# Patient Record
Sex: FEMALE | Race: WHITE | NOT HISPANIC OR LATINO | Employment: FULL TIME | URBAN - METROPOLITAN AREA
[De-identification: names, ages, dates, MRNs, and addresses within clinical notes are randomized per-mention and may not be internally consistent; named-entity substitution may affect disease eponyms.]

---

## 2024-07-15 ENCOUNTER — LAB (OUTPATIENT)
Facility: HOSPITAL | Age: 59
End: 2024-07-15
Payer: COMMERCIAL

## 2024-07-15 ENCOUNTER — OFFICE VISIT (OUTPATIENT)
Age: 59
End: 2024-07-15
Payer: COMMERCIAL

## 2024-07-15 VITALS
HEIGHT: 63 IN | BODY MASS INDEX: 30.97 KG/M2 | WEIGHT: 174.8 LBS | TEMPERATURE: 98 F | SYSTOLIC BLOOD PRESSURE: 110 MMHG | HEART RATE: 99 BPM | DIASTOLIC BLOOD PRESSURE: 74 MMHG

## 2024-07-15 DIAGNOSIS — M32.9 SYSTEMIC LUPUS ERYTHEMATOSUS, UNSPECIFIED SLE TYPE, UNSPECIFIED ORGAN INVOLVEMENT STATUS: ICD-10-CM

## 2024-07-15 DIAGNOSIS — M32.9 SYSTEMIC LUPUS ERYTHEMATOSUS, UNSPECIFIED SLE TYPE, UNSPECIFIED ORGAN INVOLVEMENT STATUS: Chronic | ICD-10-CM

## 2024-07-15 DIAGNOSIS — Z79.52 CURRENT USE OF STEROID MEDICATION: ICD-10-CM

## 2024-07-15 DIAGNOSIS — Z79.899 HIGH RISK MEDICATION USE: ICD-10-CM

## 2024-07-15 DIAGNOSIS — M05.9 SEROPOSITIVE RHEUMATOID ARTHRITIS: Primary | Chronic | ICD-10-CM

## 2024-07-15 DIAGNOSIS — Z79.52 CURRENT USE OF STEROID MEDICATION: Chronic | ICD-10-CM

## 2024-07-15 DIAGNOSIS — Z79.899 HIGH RISK MEDICATION USE: Chronic | ICD-10-CM

## 2024-07-15 DIAGNOSIS — M05.9 SEROPOSITIVE RHEUMATOID ARTHRITIS: ICD-10-CM

## 2024-07-15 LAB
ALBUMIN SERPL-MCNC: 4.3 G/DL (ref 3.5–5.2)
ALBUMIN/GLOB SERPL: 1.3 G/DL
ALP SERPL-CCNC: 94 U/L (ref 39–117)
ALT SERPL W P-5'-P-CCNC: 23 U/L (ref 1–33)
ANION GAP SERPL CALCULATED.3IONS-SCNC: 13 MMOL/L (ref 5–15)
AST SERPL-CCNC: 24 U/L (ref 1–32)
BILIRUB SERPL-MCNC: 0.4 MG/DL (ref 0–1.2)
BUN SERPL-MCNC: 17 MG/DL (ref 6–20)
BUN/CREAT SERPL: 22.4 (ref 7–25)
CALCIUM SPEC-SCNC: 9.8 MG/DL (ref 8.6–10.5)
CHLORIDE SERPL-SCNC: 106 MMOL/L (ref 98–107)
CK SERPL-CCNC: 113 U/L (ref 20–180)
CO2 SERPL-SCNC: 24 MMOL/L (ref 22–29)
CREAT SERPL-MCNC: 0.76 MG/DL (ref 0.57–1)
CRP SERPL-MCNC: <0.3 MG/DL (ref 0–0.5)
EGFRCR SERPLBLD CKD-EPI 2021: 92.1 ML/MIN/1.73
GLOBULIN UR ELPH-MCNC: 3.3 GM/DL
GLUCOSE SERPL-MCNC: 86 MG/DL (ref 65–99)
POTASSIUM SERPL-SCNC: 3.9 MMOL/L (ref 3.5–5.2)
PROT SERPL-MCNC: 7.6 G/DL (ref 6–8.5)
SODIUM SERPL-SCNC: 143 MMOL/L (ref 136–145)

## 2024-07-15 PROCEDURE — 36415 COLL VENOUS BLD VENIPUNCTURE: CPT

## 2024-07-15 PROCEDURE — 85025 COMPLETE CBC W/AUTO DIFF WBC: CPT

## 2024-07-15 PROCEDURE — 86140 C-REACTIVE PROTEIN: CPT

## 2024-07-15 PROCEDURE — 86160 COMPLEMENT ANTIGEN: CPT

## 2024-07-15 PROCEDURE — 80053 COMPREHEN METABOLIC PANEL: CPT

## 2024-07-15 PROCEDURE — 82550 ASSAY OF CK (CPK): CPT

## 2024-07-15 PROCEDURE — 86225 DNA ANTIBODY NATIVE: CPT

## 2024-07-15 PROCEDURE — 85652 RBC SED RATE AUTOMATED: CPT

## 2024-07-15 PROCEDURE — 81001 URINALYSIS AUTO W/SCOPE: CPT

## 2024-07-15 PROCEDURE — 99214 OFFICE O/P EST MOD 30 MIN: CPT | Performed by: INTERNAL MEDICINE

## 2024-07-15 RX ORDER — PREDNISONE 2.5 MG/1
2.5 TABLET ORAL DAILY
Qty: 30 TABLET | Refills: 5 | Status: SHIPPED | OUTPATIENT
Start: 2024-07-15

## 2024-07-15 RX ORDER — ALBUTEROL SULFATE 90 UG/1
AEROSOL, METERED RESPIRATORY (INHALATION)
COMMUNITY

## 2024-07-15 RX ORDER — DEXTROAMPHETAMINE SACCHARATE, AMPHETAMINE ASPARTATE MONOHYDRATE, DEXTROAMPHETAMINE SULFATE AND AMPHETAMINE SULFATE 1.25; 1.25; 1.25; 1.25 MG/1; MG/1; MG/1; MG/1
1 CAPSULE, EXTENDED RELEASE ORAL 2 TIMES DAILY
COMMUNITY

## 2024-07-15 RX ORDER — HYDROXYCHLOROQUINE SULFATE 200 MG/1
400 TABLET, FILM COATED ORAL DAILY
Qty: 60 TABLET | Refills: 5 | Status: SHIPPED | OUTPATIENT
Start: 2024-07-15

## 2024-07-15 RX ORDER — ONDANSETRON HYDROCHLORIDE 8 MG/1
TABLET, FILM COATED ORAL EVERY 8 HOURS PRN
COMMUNITY

## 2024-07-15 RX ORDER — LEVOCETIRIZINE DIHYDROCHLORIDE 5 MG/1
1 TABLET, FILM COATED ORAL DAILY
COMMUNITY

## 2024-07-15 RX ORDER — POTASSIUM CHLORIDE 750 MG/1
2 TABLET, EXTENDED RELEASE ORAL DAILY
COMMUNITY

## 2024-07-15 RX ORDER — MONTELUKAST SODIUM 10 MG/1
1 TABLET ORAL
COMMUNITY

## 2024-07-15 RX ORDER — HYDROCHLOROTHIAZIDE 25 MG/1
1 TABLET ORAL EVERY MORNING
COMMUNITY

## 2024-07-15 RX ORDER — EPINEPHRINE 0.3 MG/.3ML
INJECTION SUBCUTANEOUS
COMMUNITY

## 2024-07-15 RX ORDER — RIBOFLAVIN (VITAMIN B2) 100 MG
1 TABLET ORAL DAILY
COMMUNITY

## 2024-07-15 RX ORDER — FLUTICASONE PROPIONATE 50 MCG
2 SPRAY, SUSPENSION (ML) NASAL DAILY
COMMUNITY

## 2024-07-15 RX ORDER — DICLOFENAC SODIUM 75 MG/1
1 TABLET, DELAYED RELEASE ORAL 2 TIMES DAILY
COMMUNITY

## 2024-07-15 RX ORDER — CONJUGATED ESTROGENS 0.62 MG/G
CREAM VAGINAL
COMMUNITY

## 2024-07-15 NOTE — ASSESSMENT & PLAN NOTE
* Plaquenil 200 mg PO BID for SLE/RA overlap   * Started 5/6/21  Patient's taking hydroxychloroquine should have an eye exam at least once/year to monitor for signs of medication toxicity

## 2024-07-15 NOTE — ASSESSMENT & PLAN NOTE
* 3/11/21: HLA B27 negative, Lyme negative, RF 27. 1 (<13.9), TAURUS negative, ESR normal, CRP normal, uric acid 5.4, Hg 15.1, CBC ok otherwise   * 4/21/21: TAURUS + 1:640 homogenous.  Rf IgG 21 (-20), RF IgM 78 (0-25),  (0-83), Fernandes 151 (0-89).  Creatinine 1.06, GFR 60.    * 4/21/21: Bilateral hands and feet x-ray show mild OA changes.  * Sibling with SLE  * Sibling with RA  * Medications/treatments/interventions tried include: Tylenol, diclofenac, she saw an orthopaedic surgeon, Topiramate, Medrol Dosepak, carpal tunnel injection, Plaquenil, prednisone, she has done some physical therapy, she has met with a dietician    1. We gave her a patient education handout to take home and review regarding rheumatoid arthritis  2. Follow up in 4-6 months  3. Continue Plaquenil  4. Check labs  5. She is currently taking 2.5 mg of prednisone daily.  Attempts to taker have failed   6. Weight loss would be beneficial   7. Refill medications  8. She has taken NSAIDS like diclofenac PRN   9. Tylenol PRN  as directed is fine too

## 2024-07-15 NOTE — ASSESSMENT & PLAN NOTE
Prednisone 2.5 mg/day for RA/SLE  Ideally she will taper off as her condition improves/stabilizes. Prior attempts at tapering have resulted in increased pain/swelling/stiffness. Refill today.

## 2024-07-15 NOTE — PROGRESS NOTES
Office Follow Up      Date: 07/15/2024   Patient Name: Francine DURBIN  MRN: [id number]  YOB: 1965    Referring Physician: Vivek Gonzalez MD     Chief Complaint   Patient presents with    Rheumatoid Arthritis     Follow up    Lupus     Follow up       History of Present Illness: Francine DURBIN is a 58 y.o. female who is here today for follow up. She established care here with us as of 4/22/21.   We have prescribed her Plaquenil 200 mg BID and prednisone 2.5 mg/day. No recent injuries or infections. No fever.     Today she rates her pain as 5/10 in severity. She reports 30 minutes/day of morning stiffness. No red or hot joints.  No swelling. No neck problems. No muscle pain or weakness. She has some back discomfort.     No rash.  No hair loss. She bruises easily. No lymphadenopathy. No headaches. No paresthesias. No  issues. She is constipated. No chest pain or shortness of breath. No sicca symptoms.  She is fatigued.       Subjective     Review of Systems   Constitutional:  Positive for fatigue.   HENT:  Positive for sinus pressure.    Eyes: Negative.    Respiratory: Negative.     Cardiovascular: Negative.    Gastrointestinal:  Positive for constipation.   Endocrine: Negative.    Genitourinary: Negative.    Musculoskeletal:  Positive for arthralgias and back pain.   Skin: Negative.  Positive for bruise.   Allergic/Immunologic: Negative.    Neurological: Negative.    Hematological:  Bruises/bleeds easily.   Psychiatric/Behavioral: Negative.     All other systems reviewed and are negative.         Current Outpatient Medications:     albuterol sulfate  (90 Base) MCG/ACT inhaler, inhale 1 puff by mouth every 6 hours as needed for wheezing or shortness of breath, Disp: , Rfl:     amphetamine-dextroamphetamine XR (ADDERALL XR) 5 MG 24 hr capsule, Take 1 capsule by mouth 2 (Two) Times a Day, Disp: , Rfl:     desvenlafaxine (PRISTIQ) 50 MG 24 hr tablet, Take 1 tablet by mouth  Daily., Disp: , Rfl:     dextroamphetamine (DEXEDRINE SPANSULE) 10 MG 24 hr capsule, Take 1 capsule by mouth Daily., Disp: , Rfl:     diclofenac (VOLTAREN) 75 MG EC tablet, Take 1 tablet by mouth 2 (Two) Times a Day., Disp: , Rfl:     EPINEPHrine (EPIPEN) 0.3 MG/0.3ML solution auto-injector injection, ADMINISTER 0.3 ML IN THE MUSCLE 1 TIME AS NEEDED FOR ANAPHYLAXIS, Disp: , Rfl:     Estrogens Conjugated (Premarin) 0.625 MG/GM vaginal cream, apply 1 application VAGINALLY ONCE DAILY AS DIRECTED, Disp: , Rfl:     fluticasone (FLONASE) 50 MCG/ACT nasal spray, 2 sprays by Each Nare route Daily., Disp: , Rfl:     hydroCHLOROthiazide 25 MG tablet, Take 1 tablet by mouth Every Morning., Disp: , Rfl:     hydroxychloroquine (PLAQUENIL) 200 MG tablet, Take 2 tablets by mouth Daily., Disp: 60 tablet, Rfl: 5    levocetirizine (XYZAL) 5 MG tablet, Take 1 tablet by mouth Daily., Disp: , Rfl:     montelukast (SINGULAIR) 10 MG tablet, Take 1 tablet by mouth every night at bedtime., Disp: , Rfl:     ondansetron (ZOFRAN) 8 MG tablet, Every 8 (Eight) Hours As Needed., Disp: , Rfl:     potassium chloride (KLOR-CON M10) 10 MEQ CR tablet, Take 2 tablets by mouth Daily., Disp: , Rfl:     predniSONE (DELTASONE) 2.5 MG tablet, Take 1 tablet by mouth Daily., Disp: 30 tablet, Rfl: 5    Vitamin B-2 (RIBOFLAVIN) 100 MG tablet tablet, Take 1 tablet by mouth Daily., Disp: , Rfl:     Vitamin D, Cholecalciferol, (CHOLECALCIFEROL) 400 UNITS tablet, Take 1 tablet by mouth Daily., Disp: , Rfl:     vitamin E 100 UNIT capsule, Take 1 capsule by mouth Daily., Disp: , Rfl:     Allergies   Allergen Reactions    Penicillins Itching       I have reviewed and updated the patient's chief complaint, history of present illness, review of systems, past medical history, surgical history, family history, social history, medications and allergy list as appropriate.     Objective      Vitals:    07/15/24 1340   BP: 110/74   BP Location: Left arm   Patient Position:  "Sitting   Cuff Size: Adult   Pulse: 99   Temp: 98 °F (36.7 °C)   Weight: 79.3 kg (174 lb 12.8 oz)   Height: 160 cm (62.99\")   PainSc:   5   PainLoc: Back  Comment: lower     Body mass index is 30.97 kg/m².      Physical Exam     General: Well appearing 58 year old  female. Not in distress. She is ambulating unassisted.   SKIN: No rash.  No alopecia. No subcutaneous nodules. No digital pits or ulcers. No sclerodactyly.   HEENT: NCAT. Conjunctiva clear, no photophobia. No oral or nasal ulcers. Hearing intact.    Pulmonary: Clear to auscultation bilaterally. No wheezing, rales, or rhonchi.  CV: Regular rate and rhythm. No murmurs, rubs, or gallops.   Psych: Normal mood and affect. Alert and oriented x 3.   Extremities: No cyanosis or edema.   Musculoskeletal:  No tenderness to palpation.  No warmth or erythema. Normal range of motion of the wrists, ankles, elbows, and knees. No acute swelling.   Lymph: No palpable cervical adenopathy    Procedures    Assessment / Plan      Assessment & Plan  Seropositive rheumatoid arthritis  * 3/11/21: HLA B27 negative, Lyme negative, RF 27. 1 (<13.9), TAURUS negative, ESR normal, CRP normal, uric acid 5.4, Hg 15.1, CBC ok otherwise   * 4/21/21: TAURUS + 1:640 homogenous.  Rf IgG 21 (-20), RF IgM 78 (0-25),  (0-83), Fernandes 151 (0-89).  Creatinine 1.06, GFR 60.    * 4/21/21: Bilateral hands and feet x-ray show mild OA changes.  * Sibling with SLE  * Sibling with RA  * Medications/treatments/interventions tried include: Tylenol, diclofenac, she saw an orthopaedic surgeon, Topiramate, Medrol Dosepak, carpal tunnel injection, Plaquenil, prednisone, she has done some physical therapy, she has met with a dietician    1. We gave her a patient education handout to take home and review regarding rheumatoid arthritis  2. Follow up in 4-6 months  3. Continue Plaquenil  4. Check labs  5. She is currently taking 2.5 mg of prednisone daily.  Attempts to taker have failed   6. Weight loss " would be beneficial   7. Refill medications  8. She has taken NSAIDS like diclofenac PRN   9. Tylenol PRN  as directed is fine too   Systemic lupus erythematosus, unspecified SLE type, unspecified organ involvement status  1. Continue Plaquenil  2. Check labs  3. Continue 2.5 mg prednisone daily  High risk medication use  * Plaquenil 200 mg PO BID for SLE/RA overlap   * Started 5/6/21  Patient's taking hydroxychloroquine should have an eye exam at least once/year to monitor for signs of medication toxicity    Current use of steroid medication  Prednisone 2.5 mg/day for RA/SLE  Ideally she will taper off as her condition improves/stabilizes. Prior attempts at tapering have resulted in increased pain/swelling/stiffness. Refill today.     Orders Placed This Encounter   Procedures    Anti-DNA Antibody, Double-stranded    C4+C3    CBC Auto Differential    CK    Comprehensive Metabolic Panel    Urinalysis With Culture If Indicated -    Sedimentation Rate    C-reactive Protein     New Medications Ordered This Visit   Medications    hydroxychloroquine (PLAQUENIL) 200 MG tablet     Sig: Take 2 tablets by mouth Daily.     Dispense:  60 tablet     Refill:  5    predniSONE (DELTASONE) 2.5 MG tablet     Sig: Take 1 tablet by mouth Daily.     Dispense:  30 tablet     Refill:  5         Follow Up:   Return in about 6 months (around 1/15/2025).      Greg Lovell DO  [facility] [place]

## 2024-07-16 LAB
BACTERIA UR QL AUTO: ABNORMAL /HPF
BASOPHILS # BLD AUTO: 0.03 10*3/MM3 (ref 0–0.2)
BASOPHILS NFR BLD AUTO: 0.7 % (ref 0–1.5)
BILIRUB UR QL STRIP: NEGATIVE
C3 SERPL-MCNC: 119 MG/DL (ref 82–167)
C4 SERPL-MCNC: 22 MG/DL (ref 14–44)
CLARITY UR: CLEAR
COLOR UR: ABNORMAL
DEPRECATED RDW RBC AUTO: 42.5 FL (ref 37–54)
EOSINOPHIL # BLD AUTO: 0.04 10*3/MM3 (ref 0–0.4)
EOSINOPHIL NFR BLD AUTO: 0.9 % (ref 0.3–6.2)
ERYTHROCYTE [DISTWIDTH] IN BLOOD BY AUTOMATED COUNT: 13 % (ref 12.3–15.4)
ERYTHROCYTE [SEDIMENTATION RATE] IN BLOOD: 16 MM/HR (ref 0–30)
GLUCOSE UR STRIP-MCNC: NEGATIVE MG/DL
HCT VFR BLD AUTO: 46.1 % (ref 34–46.6)
HGB BLD-MCNC: 15.7 G/DL (ref 12–15.9)
HGB UR QL STRIP.AUTO: NEGATIVE
HOLD SPECIMEN: NORMAL
HYALINE CASTS UR QL AUTO: ABNORMAL /LPF
IMM GRANULOCYTES # BLD AUTO: 0.01 10*3/MM3 (ref 0–0.05)
IMM GRANULOCYTES NFR BLD AUTO: 0.2 % (ref 0–0.5)
KETONES UR QL STRIP: ABNORMAL
LEUKOCYTE ESTERASE UR QL STRIP.AUTO: ABNORMAL
LYMPHOCYTES # BLD AUTO: 0.82 10*3/MM3 (ref 0.7–3.1)
LYMPHOCYTES NFR BLD AUTO: 17.8 % (ref 19.6–45.3)
MCH RBC QN AUTO: 30.6 PG (ref 26.6–33)
MCHC RBC AUTO-ENTMCNC: 34.1 G/DL (ref 31.5–35.7)
MCV RBC AUTO: 89.9 FL (ref 79–97)
MONOCYTES # BLD AUTO: 0.42 10*3/MM3 (ref 0.1–0.9)
MONOCYTES NFR BLD AUTO: 9.1 % (ref 5–12)
NEUTROPHILS NFR BLD AUTO: 3.28 10*3/MM3 (ref 1.7–7)
NEUTROPHILS NFR BLD AUTO: 71.3 % (ref 42.7–76)
NITRITE UR QL STRIP: NEGATIVE
NRBC BLD AUTO-RTO: 0 /100 WBC (ref 0–0.2)
PH UR STRIP.AUTO: 5.5 [PH] (ref 5–8)
PLATELET # BLD AUTO: 287 10*3/MM3 (ref 140–450)
PMV BLD AUTO: 10.1 FL (ref 6–12)
PROT UR QL STRIP: ABNORMAL
RBC # BLD AUTO: 5.13 10*6/MM3 (ref 3.77–5.28)
RBC # UR STRIP: ABNORMAL /HPF
REF LAB TEST METHOD: ABNORMAL
SP GR UR STRIP: 1.02 (ref 1–1.03)
SQUAMOUS #/AREA URNS HPF: ABNORMAL /HPF
UROBILINOGEN UR QL STRIP: ABNORMAL
WBC # UR STRIP: ABNORMAL /HPF
WBC NRBC COR # BLD AUTO: 4.6 10*3/MM3 (ref 3.4–10.8)

## 2024-07-17 LAB — DSDNA AB SER-ACNC: 2 IU/ML (ref 0–9)

## 2025-01-16 ENCOUNTER — OFFICE VISIT (OUTPATIENT)
Age: 60
End: 2025-01-16
Payer: COMMERCIAL

## 2025-01-16 ENCOUNTER — LAB (OUTPATIENT)
Facility: HOSPITAL | Age: 60
End: 2025-01-16
Payer: COMMERCIAL

## 2025-01-16 VITALS
HEIGHT: 63 IN | BODY MASS INDEX: 31.89 KG/M2 | DIASTOLIC BLOOD PRESSURE: 80 MMHG | HEART RATE: 100 BPM | WEIGHT: 180 LBS | TEMPERATURE: 97.3 F | SYSTOLIC BLOOD PRESSURE: 130 MMHG

## 2025-01-16 DIAGNOSIS — M32.9 SYSTEMIC LUPUS ERYTHEMATOSUS, UNSPECIFIED SLE TYPE, UNSPECIFIED ORGAN INVOLVEMENT STATUS: Chronic | ICD-10-CM

## 2025-01-16 DIAGNOSIS — Z79.899 HIGH RISK MEDICATION USE: Chronic | ICD-10-CM

## 2025-01-16 DIAGNOSIS — Z79.52 CURRENT USE OF STEROID MEDICATION: ICD-10-CM

## 2025-01-16 DIAGNOSIS — M15.0 PRIMARY OSTEOARTHRITIS INVOLVING MULTIPLE JOINTS: ICD-10-CM

## 2025-01-16 DIAGNOSIS — M32.9 SYSTEMIC LUPUS ERYTHEMATOSUS, UNSPECIFIED SLE TYPE, UNSPECIFIED ORGAN INVOLVEMENT STATUS: ICD-10-CM

## 2025-01-16 DIAGNOSIS — M05.9 SEROPOSITIVE RHEUMATOID ARTHRITIS: Primary | Chronic | ICD-10-CM

## 2025-01-16 DIAGNOSIS — M15.0 PRIMARY OSTEOARTHRITIS INVOLVING MULTIPLE JOINTS: Chronic | ICD-10-CM

## 2025-01-16 DIAGNOSIS — Z79.899 HIGH RISK MEDICATION USE: ICD-10-CM

## 2025-01-16 DIAGNOSIS — Z79.52 CURRENT USE OF STEROID MEDICATION: Chronic | ICD-10-CM

## 2025-01-16 DIAGNOSIS — M05.9 SEROPOSITIVE RHEUMATOID ARTHRITIS: ICD-10-CM

## 2025-01-16 LAB
ALBUMIN SERPL-MCNC: 4.1 G/DL (ref 3.5–5.2)
ALBUMIN/GLOB SERPL: 1.2 G/DL
ALP SERPL-CCNC: 96 U/L (ref 39–117)
ALT SERPL W P-5'-P-CCNC: 25 U/L (ref 1–33)
ANION GAP SERPL CALCULATED.3IONS-SCNC: 11.2 MMOL/L (ref 5–15)
AST SERPL-CCNC: 31 U/L (ref 1–32)
BILIRUB SERPL-MCNC: 0.4 MG/DL (ref 0–1.2)
BUN SERPL-MCNC: 15 MG/DL (ref 6–20)
BUN/CREAT SERPL: 14.2 (ref 7–25)
CALCIUM SPEC-SCNC: 9.5 MG/DL (ref 8.6–10.5)
CHLORIDE SERPL-SCNC: 102 MMOL/L (ref 98–107)
CK SERPL-CCNC: 243 U/L (ref 20–180)
CO2 SERPL-SCNC: 28.8 MMOL/L (ref 22–29)
CREAT SERPL-MCNC: 1.06 MG/DL (ref 0.57–1)
CRP SERPL-MCNC: <0.3 MG/DL (ref 0–0.5)
EGFRCR SERPLBLD CKD-EPI 2021: 61.4 ML/MIN/1.73
GLOBULIN UR ELPH-MCNC: 3.4 GM/DL
GLUCOSE SERPL-MCNC: 87 MG/DL (ref 65–99)
HOLD SPECIMEN: NORMAL
POTASSIUM SERPL-SCNC: 3.9 MMOL/L (ref 3.5–5.2)
PROT SERPL-MCNC: 7.5 G/DL (ref 6–8.5)
SODIUM SERPL-SCNC: 142 MMOL/L (ref 136–145)

## 2025-01-16 PROCEDURE — 80053 COMPREHEN METABOLIC PANEL: CPT

## 2025-01-16 PROCEDURE — 86140 C-REACTIVE PROTEIN: CPT

## 2025-01-16 PROCEDURE — 86160 COMPLEMENT ANTIGEN: CPT

## 2025-01-16 PROCEDURE — 82550 ASSAY OF CK (CPK): CPT

## 2025-01-16 PROCEDURE — 81003 URINALYSIS AUTO W/O SCOPE: CPT

## 2025-01-16 PROCEDURE — 36415 COLL VENOUS BLD VENIPUNCTURE: CPT

## 2025-01-16 PROCEDURE — 85652 RBC SED RATE AUTOMATED: CPT

## 2025-01-16 PROCEDURE — 86225 DNA ANTIBODY NATIVE: CPT

## 2025-01-16 PROCEDURE — 85025 COMPLETE CBC W/AUTO DIFF WBC: CPT

## 2025-01-16 RX ORDER — PREDNISONE 2.5 MG/1
2.5 TABLET ORAL DAILY
Qty: 30 TABLET | Refills: 5 | Status: SHIPPED | OUTPATIENT
Start: 2025-01-16

## 2025-01-16 RX ORDER — HYDROXYCHLOROQUINE SULFATE 200 MG/1
400 TABLET, FILM COATED ORAL DAILY
Qty: 60 TABLET | Refills: 5 | Status: SHIPPED | OUTPATIENT
Start: 2025-01-16

## 2025-01-16 NOTE — ASSESSMENT & PLAN NOTE
Tylenol PRN is ok as directed  She has tried NSAIDS like diclofenac PRN   She has seen orthopaedic surgeons   She has done some physical therapy   She has seen pain management specialists   She has had back injections

## 2025-01-16 NOTE — ASSESSMENT & PLAN NOTE
* 3/11/21: HLA B27 negative, Lyme negative, RF 27. 1 (<13.9), TAURUS negative, ESR normal, CRP normal, uric acid 5.4, Hg 15.1, CBC ok otherwise   * 4/21/21: TAURUS + 1:640 homogenous.  Rf IgG 21 (-20), RF IgM 78 (0-25),  (0-83), Fernandes 151 (0-89).  Creatinine 1.06, GFR 60.    * 4/21/21: Bilateral hands and feet x-ray show mild OA changes.  * Sibling with SLE  * Sibling with RA  * Medications/treatments/interventions tried include: Tylenol, diclofenac, she saw an orthopaedic surgeon, Topiramate, Medrol Dosepak, carpal tunnel injection, Plaquenil, prednisone, she has done some physical therapy, she has met with a dietician, She has had back injections, she has seen pain management specialists      1. We gave her a patient education handout to take home and review regarding rheumatoid arthritis  2. Follow up in 6 months  3. Continue Plaquenil  4. Check labs  5. She is currently taking 2.5 mg of prednisone daily.  Attempts to taker have failed   6. Weight loss would be beneficial   7. Refill medications  8. She has taken NSAIDS like diclofenac PRN   9. Tylenol PRN  as directed is fine too        [Eyesight Problems] : eyesight problems [Loss Of Hearing] : hearing loss [Negative] : Gastrointestinal

## 2025-01-16 NOTE — PROGRESS NOTES
Office Follow Up      Date: 01/16/2025   Patient Name: Francine DURBIN  MRN: [id number]  YOB: 1965    Referring Physician: No ref. provider found     Chief Complaint   Patient presents with    Lupus     Follow up    Osteoarthritis     Follow up    Rheumatoid Arthritis     Follow up       History of Present Illness: Francine DURBIN is a 59 y.o. female who is here today for follow up. She established care here with us as of 4/22/21.   We have prescribed her Plaquenil 200 mg BID and prednisone 2.5 mg/day. No recent injuries or infections. No fever.     Today she rates her pain as 5/10 in severity. She reports 30 minutes/day of morning stiffness. No red or hot joints. No swelling. No neck problems. No muscle pain or weakness. She has some back discomfort.     No rash. Her skin is dry particularly around her elbow. No hair loss. She bruises easily. No lymphadenopathy. No headaches. No paresthesias. No GI or  issues. No chest pain or shortness of breath. No sicca symptoms.      Subjective     Review of Systems   Constitutional: Negative.    HENT: Negative.     Eyes:  Positive for itching.   Respiratory: Negative.     Cardiovascular: Negative.    Gastrointestinal: Negative.    Endocrine: Negative.    Genitourinary: Negative.    Musculoskeletal:  Positive for arthralgias and back pain.   Skin:  Positive for dry skin and bruise.   Allergic/Immunologic: Negative.    Neurological: Negative.    Hematological:  Bruises/bleeds easily.   Psychiatric/Behavioral: Negative.     All other systems reviewed and are negative.         Current Outpatient Medications:     albuterol sulfate  (90 Base) MCG/ACT inhaler, inhale 1 puff by mouth every 6 hours as needed for wheezing or shortness of breath, Disp: , Rfl:     amphetamine-dextroamphetamine XR (ADDERALL XR) 5 MG 24 hr capsule, Take 1 capsule by mouth 2 (Two) Times a Day, Disp: , Rfl:     desvenlafaxine (PRISTIQ) 50 MG 24 hr tablet, Take 1  tablet by mouth Daily., Disp: , Rfl:     dextroamphetamine (DEXEDRINE SPANSULE) 10 MG 24 hr capsule, Take 1 capsule by mouth Daily., Disp: , Rfl:     diclofenac (VOLTAREN) 75 MG EC tablet, Take 1 tablet by mouth 2 (Two) Times a Day., Disp: , Rfl:     EPINEPHrine (EPIPEN) 0.3 MG/0.3ML solution auto-injector injection, ADMINISTER 0.3 ML IN THE MUSCLE 1 TIME AS NEEDED FOR ANAPHYLAXIS, Disp: , Rfl:     Estrogens Conjugated (Premarin) 0.625 MG/GM vaginal cream, apply 1 application VAGINALLY ONCE DAILY AS DIRECTED, Disp: , Rfl:     fluticasone (FLONASE) 50 MCG/ACT nasal spray, 2 sprays by Each Nare route Daily., Disp: , Rfl:     hydroCHLOROthiazide 25 MG tablet, Take 1 tablet by mouth Every Morning., Disp: , Rfl:     hydroxychloroquine (PLAQUENIL) 200 MG tablet, Take 2 tablets by mouth Daily., Disp: 60 tablet, Rfl: 5    levocetirizine (XYZAL) 5 MG tablet, Take 1 tablet by mouth Daily., Disp: , Rfl:     montelukast (SINGULAIR) 10 MG tablet, Take 1 tablet by mouth every night at bedtime., Disp: , Rfl:     ondansetron (ZOFRAN) 8 MG tablet, Every 8 (Eight) Hours As Needed., Disp: , Rfl:     potassium chloride (KLOR-CON M10) 10 MEQ CR tablet, Take 2 tablets by mouth Daily., Disp: , Rfl:     predniSONE (DELTASONE) 2.5 MG tablet, Take 1 tablet by mouth Daily., Disp: 30 tablet, Rfl: 5    Vitamin B-2 (RIBOFLAVIN) 100 MG tablet tablet, Take 1 tablet by mouth Daily., Disp: , Rfl:     Vitamin D, Cholecalciferol, (CHOLECALCIFEROL) 400 UNITS tablet, Take 1 tablet by mouth Daily., Disp: , Rfl:     vitamin E 100 UNIT capsule, Take 1 capsule by mouth Daily., Disp: , Rfl:     Allergies   Allergen Reactions    Penicillins Itching and Unknown (See Comments)       I have reviewed and updated the patient's chief complaint, history of present illness, review of systems, past medical history, surgical history, family history, social history, medications and allergy list as appropriate.     Objective      Vitals:    01/16/25 1140   BP: 130/80  "  BP Location: Left arm   Pulse: 100   Temp: 97.3 °F (36.3 °C)   Weight: 81.6 kg (180 lb)   Height: 160 cm (63\")   PainSc:   5   PainLoc: Elbow  Comment: lt elbow has been real dry and sore     Body mass index is 31.89 kg/m².      Physical Exam     General: Well appearing 59 year old  female. Not in distress. She is ambulating unassisted.   SKIN: No rash.  No alopecia. No subcutaneous nodules. No digital pits or ulcers. No sclerodactyly.   HEENT: NCAT. Conjunctiva clear, no photophobia. No oral or nasal ulcers. Hearing intact.    Pulmonary: Clear to auscultation bilaterally. No wheezing, rales, or rhonchi.  CV: Regular rate and rhythm. No murmurs, rubs, or gallops.   Psych: Normal mood and affect. Alert and oriented x 3.   Extremities: No cyanosis or edema.   Musculoskeletal:  No tenderness to palpation.  No warmth or erythema. Normal range of motion of the wrists, ankles, elbows, and knees. No acute swelling.   Lymph: No palpable cervical adenopathy    Procedures    Assessment / Plan      Assessment & Plan  Seropositive rheumatoid arthritis  * 3/11/21: HLA B27 negative, Lyme negative, RF 27. 1 (<13.9), TAURUS negative, ESR normal, CRP normal, uric acid 5.4, Hg 15.1, CBC ok otherwise   * 4/21/21: TAURUS + 1:640 homogenous.  Rf IgG 21 (-20), RF IgM 78 (0-25),  (0-83), Fernandes 151 (0-89).  Creatinine 1.06, GFR 60.    * 4/21/21: Bilateral hands and feet x-ray show mild OA changes.  * Sibling with SLE  * Sibling with RA  * Medications/treatments/interventions tried include: Tylenol, diclofenac, she saw an orthopaedic surgeon, Topiramate, Medrol Dosepak, carpal tunnel injection, Plaquenil, prednisone, she has done some physical therapy, she has met with a dietician, She has had back injections, she has seen pain management specialists      1. We gave her a patient education handout to take home and review regarding rheumatoid arthritis  2. Follow up in 6 months  3. Continue Plaquenil  4. Check labs  5. She is " currently taking 2.5 mg of prednisone daily.  Attempts to taker have failed   6. Weight loss would be beneficial   7. Refill medications  8. She has taken NSAIDS like diclofenac PRN   9. Tylenol PRN  as directed is fine too       Systemic lupus erythematosus, unspecified SLE type, unspecified organ involvement status  1. Continue Plaquenil  2. Check labs  3. Continue 2.5 mg prednisone daily  High risk medication use  * Plaquenil 200 mg PO BID for SLE/RA overlap   * Started 5/6/21  Patient's taking hydroxychloroquine should have an eye exam at least once/year to monitor for signs of medication toxicity  Current use of steroid medication  Prednisone 2.5 mg/day for RA/SLE  Ideally she will taper off as her condition improves/stabilizes.   Prior attempts at tapering have resulted in increased pain/swelling/stiffness.   Refill today.   Primary osteoarthritis involving multiple joints  Tylenol PRN is ok as directed  She has tried NSAIDS like diclofenac PRN   She has seen orthopaedic surgeons   She has done some physical therapy   She has seen pain management specialists   She has had back injections     Orders Placed This Encounter   Procedures    Anti-DNA Antibody, Double-stranded    C4+C3    CBC Auto Differential    CK    Comprehensive Metabolic Panel    Urinalysis With Culture If Indicated -    Sedimentation Rate    C-reactive Protein       New Medications Ordered This Visit   Medications    predniSONE (DELTASONE) 2.5 MG tablet     Sig: Take 1 tablet by mouth Daily.     Dispense:  30 tablet     Refill:  5    hydroxychloroquine (PLAQUENIL) 200 MG tablet     Sig: Take 2 tablets by mouth Daily.     Dispense:  60 tablet     Refill:  5           Follow Up:   Return in about 6 months (around 7/16/2025).      Greg Lovell DO  [facility] [place] [facility] [place]

## 2025-01-17 LAB
BASOPHILS # BLD AUTO: 0.04 10*3/MM3 (ref 0–0.2)
BASOPHILS NFR BLD AUTO: 1.1 % (ref 0–1.5)
BILIRUB UR QL STRIP: NEGATIVE
C3 SERPL-MCNC: 132 MG/DL (ref 82–167)
C4 SERPL-MCNC: 21 MG/DL (ref 14–44)
CLARITY UR: CLEAR
COLOR UR: ABNORMAL
DEPRECATED RDW RBC AUTO: 40.6 FL (ref 37–54)
EOSINOPHIL # BLD AUTO: 0.11 10*3/MM3 (ref 0–0.4)
EOSINOPHIL NFR BLD AUTO: 2.9 % (ref 0.3–6.2)
ERYTHROCYTE [DISTWIDTH] IN BLOOD BY AUTOMATED COUNT: 12.5 % (ref 12.3–15.4)
ERYTHROCYTE [SEDIMENTATION RATE] IN BLOOD: 23 MM/HR (ref 0–30)
GLUCOSE UR STRIP-MCNC: NEGATIVE MG/DL
HCT VFR BLD AUTO: 43.3 % (ref 34–46.6)
HGB BLD-MCNC: 14.8 G/DL (ref 12–15.9)
HGB UR QL STRIP.AUTO: NEGATIVE
IMM GRANULOCYTES # BLD AUTO: 0.01 10*3/MM3 (ref 0–0.05)
IMM GRANULOCYTES NFR BLD AUTO: 0.3 % (ref 0–0.5)
KETONES UR QL STRIP: NEGATIVE
LEUKOCYTE ESTERASE UR QL STRIP.AUTO: NEGATIVE
LYMPHOCYTES # BLD AUTO: 0.93 10*3/MM3 (ref 0.7–3.1)
LYMPHOCYTES NFR BLD AUTO: 24.5 % (ref 19.6–45.3)
MCH RBC QN AUTO: 31 PG (ref 26.6–33)
MCHC RBC AUTO-ENTMCNC: 34.2 G/DL (ref 31.5–35.7)
MCV RBC AUTO: 90.8 FL (ref 79–97)
MONOCYTES # BLD AUTO: 0.44 10*3/MM3 (ref 0.1–0.9)
MONOCYTES NFR BLD AUTO: 11.6 % (ref 5–12)
NEUTROPHILS NFR BLD AUTO: 2.27 10*3/MM3 (ref 1.7–7)
NEUTROPHILS NFR BLD AUTO: 59.6 % (ref 42.7–76)
NITRITE UR QL STRIP: NEGATIVE
NRBC BLD AUTO-RTO: 0 /100 WBC (ref 0–0.2)
PH UR STRIP.AUTO: 7.5 [PH] (ref 5–8)
PLATELET # BLD AUTO: 289 10*3/MM3 (ref 140–450)
PMV BLD AUTO: 10.1 FL (ref 6–12)
PROT UR QL STRIP: NEGATIVE
RBC # BLD AUTO: 4.77 10*6/MM3 (ref 3.77–5.28)
SP GR UR STRIP: 1.02 (ref 1–1.03)
UROBILINOGEN UR QL STRIP: ABNORMAL
WBC NRBC COR # BLD AUTO: 3.8 10*3/MM3 (ref 3.4–10.8)

## 2025-01-18 LAB — DSDNA AB SER-ACNC: 1 IU/ML (ref 0–9)

## 2025-07-02 NOTE — ASSESSMENT & PLAN NOTE
* Plaquenil 200 mg PO BID for SLE/RA overlap started 5/6/21  Patients taking hydroxychloroquine should have an eye exam at least once/year to monitor for signs of medication toxicity

## 2025-07-02 NOTE — ASSESSMENT & PLAN NOTE
* 3/11/21: HLA B27 negative, Lyme negative, RF 27. 1 (<13.9), TAURUS negative, ESR normal, CRP normal, uric acid 5.4, Hg 15.1, CBC ok otherwise   * 4/21/21: TAURUS + 1:640 homogenous.  Rf IgG 21 (-20), RF IgM 78 (0-25),  (0-83), Fernandes 151 (0-89).  Creatinine 1.06, GFR 60.    * 4/21/21: Bilateral hands and feet x-ray show mild OA changes.  * Sibling with SLE  * Sibling with RA  * Medications/treatments/interventions tried include: Tylenol, diclofenac, she saw an orthopaedic surgeon, Topiramate, Medrol Dosepak, carpal tunnel injection, Plaquenil, prednisone, she has done some physical therapy, she has met with a dietician, She has had back injections, she has seen pain management specialists      Continue Plaquenil 200 mg BID  Check labs today.  She is currently taking 2.5 mg of prednisone daily.  Attempts to taper have failed   Refill medications  She takes diclofenac PRN from other provider  Tylenol PRN as directed is fine too   RTC 4-6 months

## 2025-07-02 NOTE — ASSESSMENT & PLAN NOTE
Prednisone 2.5 mg/day for RA/SLE  Ideally she will taper off as her condition improves/stabilizes.   Prior attempts at tapering have resulted in increased pain/swelling/stiffness.   Refill today.   We will check DEXA with next OV  Discussed risks of long-term steroid use including avascular necrosis, osteoporosis, fractures, ulcers, infection, diabetes, cataracts, weight gain

## 2025-07-02 NOTE — PROGRESS NOTES
Office Follow Up      Date: 07/17/2025   Patient Name: Francine DURBIN  MRN: [id number]  YOB: 1965    Referring Physician: No ref. provider found     Chief Complaint   Patient presents with    Lupus    Rheumatoid Arthritis       History of Present Illness: Francine DURBIN is a 59 y.o. female who is here today for follow up. She established care here with us as of 4/22/21.   We have prescribed her Plaquenil 200 mg BID and prednisone 2.5 mg/day. No recent injuries or serious illnesses or infections. No fever.     Today she rates her pain as 5/10 in severity. She reports 5 minutes/day of morning stiffness. Her pain is mostly in her back. No red or hot joints. No joint swelling. No neck problems. No muscle pain or weakness.     Subjective     Review of Systems not completed today      Current Outpatient Medications:     albuterol sulfate  (90 Base) MCG/ACT inhaler, inhale 1 puff by mouth every 6 hours as needed for wheezing or shortness of breath, Disp: , Rfl:     amphetamine-dextroamphetamine XR (ADDERALL XR) 5 MG 24 hr capsule, Take 1 capsule by mouth 2 (Two) Times a Day, Disp: , Rfl:     desvenlafaxine (PRISTIQ) 50 MG 24 hr tablet, Take 1 tablet by mouth Daily., Disp: , Rfl:     dextroamphetamine (DEXEDRINE SPANSULE) 10 MG 24 hr capsule, Take 1 capsule by mouth Daily., Disp: , Rfl:     diclofenac (VOLTAREN) 75 MG EC tablet, Take 1 tablet by mouth 2 (Two) Times a Day., Disp: , Rfl:     docusate sodium 100 MG capsule, Take 1 capsule by mouth 2 (Two) Times a Day., Disp: , Rfl:     EPINEPHrine (EPIPEN) 0.3 MG/0.3ML solution auto-injector injection, ADMINISTER 0.3 ML IN THE MUSCLE 1 TIME AS NEEDED FOR ANAPHYLAXIS, Disp: , Rfl:     Estrogens Conjugated (Premarin) 0.625 MG/GM vaginal cream, apply 1 application VAGINALLY ONCE DAILY AS DIRECTED, Disp: , Rfl:     fluticasone (FLONASE) 50 MCG/ACT nasal spray, 2 sprays by Each Nare route Daily., Disp: , Rfl:     hydroCHLOROthiazide 25  "MG tablet, Take 1 tablet by mouth Every Morning., Disp: , Rfl:     Hydrocortisone 2 % cream, Apply 1 Application topically As Needed., Disp: , Rfl:     hydroxychloroquine (PLAQUENIL) 200 MG tablet, Take 2 tablets by mouth Daily., Disp: 180 tablet, Rfl: 1    levocetirizine (XYZAL) 5 MG tablet, Take 1 tablet by mouth Daily., Disp: , Rfl:     montelukast (SINGULAIR) 10 MG tablet, Take 1 tablet by mouth every night at bedtime., Disp: , Rfl:     multivitamin with minerals (MULTIVITAMIN ADULTS 50+ PO), Take 1 tablet by mouth Daily., Disp: , Rfl:     ondansetron (ZOFRAN) 8 MG tablet, Every 8 (Eight) Hours As Needed., Disp: , Rfl:     potassium chloride (KLOR-CON M10) 10 MEQ CR tablet, Take 2 tablets by mouth Daily., Disp: , Rfl:     predniSONE (DELTASONE) 2.5 MG tablet, Take 1 tablet by mouth Daily., Disp: 90 tablet, Rfl: 1    Vitamin B-2 (RIBOFLAVIN) 100 MG tablet tablet, Take 1 tablet by mouth Daily., Disp: , Rfl:     Vitamin D, Cholecalciferol, (CHOLECALCIFEROL) 400 UNITS tablet, Take 1 tablet by mouth Daily., Disp: , Rfl:     vitamin E 100 UNIT capsule, Take 1 capsule by mouth Daily., Disp: , Rfl:     Allergies   Allergen Reactions    Penicillins Itching and Unknown (See Comments)       I have reviewed and updated the patient's chief complaint, history of present illness, review of systems, past medical history, surgical history, family history, social history, medications and allergy list as appropriate.     Objective      Vitals:    07/17/25 1259   BP: 126/74   Pulse: 88   Temp: 98.3 °F (36.8 °C)   Weight: 85.5 kg (188 lb 9.6 oz)   Height: 160 cm (63\")   PainSc: 5    PainLoc: Back       Body mass index is 33.41 kg/m².  Defer to PCP      Physical Exam     General: Well appearing 59 year old  female. Not in distress. She is ambulating unassisted.   SKIN: No rash.  No alopecia. No subcutaneous nodules. No digital pits or ulcers. No sclerodactyly.   HEENT: NCAT. Conjunctiva clear, no photophobia. Hearing intact. " She is wearing a mask today.  Pulmonary: Normal effort  CV: Regular rate and rhythm.   Psych: Normal mood and affect. Alert and oriented x 3.   Extremities: No cyanosis or edema.   Musculoskeletal:  No tenderness to palpation.  No warmth or erythema. Small nodule right wrist. Normal range of motion of the wrists, ankles, elbows, and knees. No acute swelling.   Lymph: No palpable cervical adenopathy      Assessment / Plan      Assessment & Plan  Seropositive rheumatoid arthritis  * 3/11/21: HLA B27 negative, Lyme negative, RF 27. 1 (<13.9), TAURUS negative, ESR normal, CRP normal, uric acid 5.4, Hg 15.1, CBC ok otherwise   * 4/21/21: TAURUS + 1:640 homogenous.  Rf IgG 21 (-20), RF IgM 78 (0-25),  (0-83), Fernnades 151 (0-89).  Creatinine 1.06, GFR 60.    * 4/21/21: Bilateral hands and feet x-ray show mild OA changes.  * Sibling with SLE  * Sibling with RA  * Medications/treatments/interventions tried include: Tylenol, diclofenac, she saw an orthopaedic surgeon, Topiramate, Medrol Dosepak, carpal tunnel injection, Plaquenil, prednisone, she has done some physical therapy, she has met with a dietician, She has had back injections, she has seen pain management specialists      Continue Plaquenil 200 mg BID  Check labs today.  She is currently taking 2.5 mg of prednisone daily.  Attempts to taper have failed   Refill medications  She takes diclofenac PRN from other provider  Tylenol PRN as directed is fine too   RTC 4-6 months  Systemic lupus erythematosus, unspecified SLE type, unspecified organ involvement status  This seems stable.  Continue Plaquenil  Check labs  Continue 2.5 mg prednisone daily  High risk medication use  * Plaquenil 200 mg PO BID for SLE/RA overlap started 5/6/21  Patients taking hydroxychloroquine should have an eye exam at least once/year to monitor for signs of medication toxicity  Current use of steroid medication  Prednisone 2.5 mg/day for RA/SLE  Ideally she will taper off as her condition  improves/stabilizes.   Prior attempts at tapering have resulted in increased pain/swelling/stiffness.   Refill today.   We will check DEXA with next OV  Discussed risks of long-term steroid use including avascular necrosis, osteoporosis, fractures, ulcers, infection, diabetes, cataracts, weight gain  Primary osteoarthritis involving multiple joints  Tylenol PRN is ok as directed  She has tried NSAIDS like diclofenac PRN   She has seen orthopaedic surgeons   She has done some physical therapy   She has seen pain management specialists   She has had back injections       Discussed plan of care in detail with the patient today.  Patient verbalized understanding and agrees.    I confirm accuracy of unchanged data/findings which have been carried forward from previous visit.  I have updated appropriately those that have changed.    Follow Up:   Return in about 6 months (around 1/17/2026) for Dr. Lovell, with DEXA.        BRIDGETT Adams  [facility] [place] [facility]

## 2025-07-17 ENCOUNTER — OFFICE VISIT (OUTPATIENT)
Age: 60
End: 2025-07-17
Payer: MEDICARE

## 2025-07-17 VITALS
HEART RATE: 88 BPM | HEIGHT: 63 IN | WEIGHT: 188.6 LBS | BODY MASS INDEX: 33.42 KG/M2 | DIASTOLIC BLOOD PRESSURE: 74 MMHG | TEMPERATURE: 98.3 F | SYSTOLIC BLOOD PRESSURE: 126 MMHG

## 2025-07-17 DIAGNOSIS — Z79.899 HIGH RISK MEDICATION USE: ICD-10-CM

## 2025-07-17 DIAGNOSIS — Z79.52 CURRENT USE OF STEROID MEDICATION: ICD-10-CM

## 2025-07-17 DIAGNOSIS — M32.9 SYSTEMIC LUPUS ERYTHEMATOSUS, UNSPECIFIED SLE TYPE, UNSPECIFIED ORGAN INVOLVEMENT STATUS: ICD-10-CM

## 2025-07-17 DIAGNOSIS — M15.0 PRIMARY OSTEOARTHRITIS INVOLVING MULTIPLE JOINTS: ICD-10-CM

## 2025-07-17 DIAGNOSIS — M05.9 SEROPOSITIVE RHEUMATOID ARTHRITIS: Primary | ICD-10-CM

## 2025-07-17 PROBLEM — G43.009 MIGRAINE WITHOUT AURA: Status: ACTIVE | Noted: 2019-08-07

## 2025-07-17 PROBLEM — F41.9 ANXIETY: Status: ACTIVE | Noted: 2021-05-17

## 2025-07-17 PROBLEM — I10 BENIGN HYPERTENSION: Status: ACTIVE | Noted: 2021-03-15

## 2025-07-17 PROBLEM — M47.816 LUMBAR SPONDYLOSIS: Status: ACTIVE | Noted: 2022-04-25

## 2025-07-17 RX ORDER — HYDROXYCHLOROQUINE SULFATE 200 MG/1
400 TABLET, FILM COATED ORAL DAILY
Qty: 180 TABLET | Refills: 1 | Status: SHIPPED | OUTPATIENT
Start: 2025-07-17

## 2025-07-17 RX ORDER — PSEUDOEPHEDRINE HCL 30 MG
100 TABLET ORAL 2 TIMES DAILY
COMMUNITY

## 2025-07-17 RX ORDER — PREDNISONE 2.5 MG/1
2.5 TABLET ORAL DAILY
Qty: 90 TABLET | Refills: 1 | Status: SHIPPED | OUTPATIENT
Start: 2025-07-17

## 2025-07-21 LAB
ALBUMIN SERPL-MCNC: 4.5 G/DL (ref 3.5–5.2)
ALBUMIN/GLOB SERPL: 1.5 G/DL
ALP SERPL-CCNC: 106 U/L (ref 39–117)
ALT SERPL-CCNC: 24 U/L (ref 1–33)
AST SERPL-CCNC: 26 U/L (ref 1–32)
BILIRUB SERPL-MCNC: 0.5 MG/DL (ref 0–1.2)
BUN SERPL-MCNC: 19 MG/DL (ref 6–20)
BUN/CREAT SERPL: 20.2 (ref 7–25)
C3 SERPL-MCNC: 127 MG/DL (ref 82–167)
C4 SERPL-MCNC: 21 MG/DL (ref 12–38)
CALCIUM SERPL-MCNC: 9.8 MG/DL (ref 8.6–10.5)
CHLORIDE SERPL-SCNC: 102 MMOL/L (ref 98–107)
CO2 SERPL-SCNC: 28.8 MMOL/L (ref 22–29)
CREAT SERPL-MCNC: 0.94 MG/DL (ref 0.57–1)
CRP SERPL-MCNC: <0.3 MG/DL (ref 0–0.5)
DSDNA AB SER QL CLIF: NEGATIVE
EGFRCR SERPLBLD CKD-EPI 2021: 70.9 ML/MIN/1.73
ERYTHROCYTE [DISTWIDTH] IN BLOOD BY AUTOMATED COUNT: 12 % (ref 12.3–15.4)
ERYTHROCYTE [SEDIMENTATION RATE] IN BLOOD BY WESTERGREN METHOD: 4 MM/HR (ref 0–30)
GLOBULIN SER CALC-MCNC: 3 GM/DL
GLUCOSE SERPL-MCNC: 61 MG/DL (ref 65–99)
HCT VFR BLD AUTO: 46.2 % (ref 34–46.6)
HGB BLD-MCNC: 15.8 G/DL (ref 12–15.9)
MCH RBC QN AUTO: 31.3 PG (ref 26.6–33)
MCHC RBC AUTO-ENTMCNC: 34.2 G/DL (ref 31.5–35.7)
MCV RBC AUTO: 91.7 FL (ref 79–97)
PLATELET # BLD AUTO: 319 10*3/MM3 (ref 140–450)
POTASSIUM SERPL-SCNC: 4.2 MMOL/L (ref 3.5–5.2)
PROT SERPL-MCNC: 7.5 G/DL (ref 6–8.5)
RBC # BLD AUTO: 5.04 10*6/MM3 (ref 3.77–5.28)
SODIUM SERPL-SCNC: 140 MMOL/L (ref 136–145)
WBC # BLD AUTO: 7.06 10*3/MM3 (ref 3.4–10.8)